# Patient Record
Sex: FEMALE | URBAN - METROPOLITAN AREA
[De-identification: names, ages, dates, MRNs, and addresses within clinical notes are randomized per-mention and may not be internally consistent; named-entity substitution may affect disease eponyms.]

---

## 2023-08-29 ENCOUNTER — OFFICE VISIT (OUTPATIENT)
Dept: URBAN - METROPOLITAN AREA CLINIC 109 | Facility: CLINIC | Age: 38
End: 2023-08-29
Payer: COMMERCIAL

## 2023-08-29 ENCOUNTER — DASHBOARD ENCOUNTERS (OUTPATIENT)
Age: 38
End: 2023-08-29

## 2023-08-29 VITALS
SYSTOLIC BLOOD PRESSURE: 150 MMHG | WEIGHT: 293 LBS | HEART RATE: 110 BPM | DIASTOLIC BLOOD PRESSURE: 111 MMHG | HEIGHT: 69 IN | TEMPERATURE: 98.9 F | BODY MASS INDEX: 43.4 KG/M2

## 2023-08-29 DIAGNOSIS — E66.01 MORBID OBESITY: ICD-10-CM

## 2023-08-29 DIAGNOSIS — I10 PRIMARY HYPERTENSION: ICD-10-CM

## 2023-08-29 DIAGNOSIS — R10.13 EPIGASTRIC PAIN: ICD-10-CM

## 2023-08-29 PROBLEM — 238136002: Status: ACTIVE | Noted: 2023-08-29

## 2023-08-29 PROBLEM — 59621000: Status: ACTIVE | Noted: 2023-08-29

## 2023-08-29 PROCEDURE — 99203 OFFICE O/P NEW LOW 30 MIN: CPT | Performed by: INTERNAL MEDICINE

## 2023-08-29 NOTE — HPI-TODAY'S VISIT:
The patient has been referred for evaluation of abdominal pain. She describes chronic LUQ and LLQ pain for about 2 months.  Sx not influenced by food. Pain can be continuous some days.  Ocassionally pain is severe.  No n/v, abnormal bowel habits, blood in the stool, weight loss. Pain is tight or crampy in nature.  Sx improved by omeprazole which she started a week ago. No hx PUD.  Takes ibuprofen 1-2x per week. She has been in f/u with a gynecologist and pelvic u/s is planned due to a FH. FH negative for CRC.  PMH HTN